# Patient Record
Sex: FEMALE | Race: BLACK OR AFRICAN AMERICAN | NOT HISPANIC OR LATINO | Employment: FULL TIME | URBAN - METROPOLITAN AREA
[De-identification: names, ages, dates, MRNs, and addresses within clinical notes are randomized per-mention and may not be internally consistent; named-entity substitution may affect disease eponyms.]

---

## 2017-01-01 PROCEDURE — 99283 EMERGENCY DEPT VISIT LOW MDM: CPT | Mod: 25

## 2017-01-02 ENCOUNTER — HOSPITAL ENCOUNTER (EMERGENCY)
Facility: HOSPITAL | Age: 27
Discharge: HOME | End: 2017-01-02
Attending: EMERGENCY MEDICINE
Payer: COMMERCIAL

## 2017-01-02 VITALS
WEIGHT: 293 LBS | BODY MASS INDEX: 45.99 KG/M2 | TEMPERATURE: 98 F | DIASTOLIC BLOOD PRESSURE: 76 MMHG | SYSTOLIC BLOOD PRESSURE: 128 MMHG | HEIGHT: 67 IN | OXYGEN SATURATION: 99 % | HEART RATE: 84 BPM | RESPIRATION RATE: 16 BRPM

## 2017-01-02 DIAGNOSIS — R73.9 HYPERGLYCEMIA: Primary | ICD-10-CM

## 2017-01-02 LAB
B-HCG UR QL: NEGATIVE
BILIRUB UR QL STRIP: NEGATIVE
CLARITY UR: CLEAR
COLOR UR: YELLOW
GLUCOSE UR QL STRIP: NEGATIVE
HGB UR QL STRIP: NEGATIVE
KETONES UR QL STRIP: NEGATIVE
LEUKOCYTE ESTERASE UR QL STRIP: NEGATIVE
NITRITE UR QL STRIP: NEGATIVE
PH UR STRIP: 7 [PH] (ref 5–8)
POCT GLUCOSE: 231 MG/DL (ref 70–110)
PROT UR QL STRIP: NEGATIVE
SP GR UR STRIP: 1.02 (ref 1–1.03)
URN SPEC COLLECT METH UR: NORMAL
UROBILINOGEN UR STRIP-ACNC: NEGATIVE EU/DL

## 2017-01-02 PROCEDURE — 82962 GLUCOSE BLOOD TEST: CPT

## 2017-01-02 PROCEDURE — 81003 URINALYSIS AUTO W/O SCOPE: CPT

## 2017-01-02 PROCEDURE — 81025 URINE PREGNANCY TEST: CPT

## 2017-01-02 NOTE — DISCHARGE INSTRUCTIONS
How to Check Your Blood Sugar  Keeping track of how much sugar (glucose) is in your blood is an important part of self-care when you have diabetes. This is also called self-monitoring of blood glucose (SMBG). To make sure your glucose and insulin are in balance, check your blood sugar as instructed by your healthcare provider. You may need to check your blood glucose levels at certain times every day. Or you may need to check them only a few times a week.  What you need  To check your blood sugar, make sure you have the following:   · A small pricking needle (lancing device)  · Test strips  · A glucose meter  · A notebook, chart, or log book and pen or pencil   Using a blood glucose meter    You can check your blood sugar at home, at work, and anywhere else. Your diabetes team will help you choose a blood glucose meter. A meter measures the amount of glucose in a tiny drop of blood. Youll use a device called a lancet to draw a drop of blood. Put the strip in the meter first. Then touch the test strip to the drop of blood. The meter then gives you a number (reading) that tells you the level of your blood sugar.  Aim for your target range  Your blood sugar should be in your target range--not too high and not too low. A target range is where your blood sugar level is healthiest. Staying in this range as much as possible will help lower your risk for health problems (complications). Your diabetes team will help you figure out the best target range for you. That range depends on many things. They include your age, other health problems, how well your diabetes is controlled, and how long you have had diabetes. In general, target ranges are:  · Control of blood glucose (hemoglobin A1c or A1c): generally, 7.0% or less. You will usually have this test at the lab.  · Before a meal (preprandial glucose): Between 80 and 130 mg/dL.  · One to 2 hours after a meal (postprandial glucose): Less than 180 mg/dL.  Track your  readings  Use a notebook, chart, or log book to keep track of your readings. Write down the date, time, and your blood sugar level numbers. This helps you see patterns, such as high blood sugar after eating certain foods. Take your log along when you see your healthcare provider. Your blood glucose levels will help your provider decide if he or she needs to make any changes to your management plan. To check your blood sugar, follow the steps below.  Step 1. Get ready  · Wash your hands with soap and warm (not hot) water.  · Follow all of the instructions that came with your glucometer. Be sure that your test strips were designed to be used with your meter and are not .   Step 2. Draw a drop of blood  · Prick the side of your finger with the lancet. Squeeze gently until you get a drop of blood. Squeezing too hard can cause an inaccurate reading.  · Put the lancet in a special sharps container. Ask your healthcare team where you can buy one or what you can use to throw away any sharps.  · If you are unable to get enough blood, hold your hand at your side and gently shake it.  Step 3. Place the drop on a strip  · Wait for the meter to show a message or symbol that it is time to test.  · Touch the test strip to the drop of blood.  · Be sure to follow the instructions included with meter.  Step 4. Read and record your results  · Wait for your meter to show the result.  · If you see an error message, recheck using a fresh strip and a fresh drop of blood. Also recheck if the glucose numbers aren't what you expect--too low without symptoms, or too high for no reason.  · Write the results in your log book.  © 2697-1073 [employer]. [address] [place] [address], [place] [address]. All rights reserved. This information is not intended as a substitute for professional medical care. Always follow your healthcare professional's instructions.          Diabetes with High Blood Sugar  You have been treated for high  "blood sugar (hyperglycemia). This may be because of an infection or other illness, eating too many sweets or starches, or not taking enough insulin.  Home care  Monitor and write down your blood sugar level at least twice a day. Do this before breakfast and before dinner. If you take insulin, record your routine insulin dose as well. Also record any additional doses required based on your sliding scale. Do this for the next 3 to 5 days.  High blood sugar may cause symptoms that you can learn to recognize, such as:  · Frequent urination  · Thirst  · Dizziness  · Headache  · Shortness of breath  · Breath that smells fruity  · Nausea or vomiting  · Abdominal pain  · Drowsiness or loss of consciousness  If you have high-blood-sugar symptoms, use a blood or urine test to find out what your blood sugar level is. If it is above your usual range, use the "sliding scale" regular insulin dose prescribed by your healthcare provider. If you were not given a range for your insulin dose, contact your healthcare provider for more advice.  Follow-up care  Follow up with your healthcare provider, or as advised. You may need to meet with your healthcare provider in the next week. You will likely review your blood sugar records together. You may also talk to your provider about adjusting your dose of insulin or other medicine for blood sugar.  When to seek medical advice  Call your healthcare provider right away if these occur:  · High blood sugar symptoms (Symptoms are described above.)  · Blood sugar over 300 mg/dl If you cant reach your healthcare provider, go to a hospital emergency room or urgent care center  © 2568-1396 [employer]. [address] [place] [address], [place] [address]. All rights reserved. This information is not intended as a substitute for professional medical care. Always follow your healthcare professional's instructions.          High Blood Sugar (Hyperglycemia)  Too much glucose (sugar) in your blood " is called hyperglycemia or high blood sugar. High blood sugar can lead to a dangerous condition called ketoacidosis. In severe cases, it can lead to dehydration and coma.    Possible causes of hyperglycemia  · Inadequate treatment plan for diabetes   · Being sick  · Being under stress  · Taking certain medicines, such as steroids  · Eating too much food, especially carbohydrates  · Being less active than usual  · Not taking enough diabetes medicine  Symptoms of hyperglycemia  Hyperglycemia may not cause symptoms. If you do have symptoms, they may include:  · Thirst  · Frequent need to urinate  · Feeling tired  · Nausea and vomiting  · Itchy, dry skin  · Blurry vision  · Fast breathing and breath that smells fruity   · Weakness  · Dizziness  · Wounds or skin infections that dont heal  · Unexplained weight loss if hyperglycemia lasts for more than a few days   What you should do  Make sure you do the following:  · Check your blood sugar.  · Drink plenty of sugar-free, caffeine-free liquids such as water. Dont drink fruit juice.  · Check your blood sugar again every 4 hours. If you take insulin or diabetes medicines, follow your sick-day plan for taking medicine. Call your healthcare provider if you are not able to eat.  · Check your blood or urine for ketones as directed.  · Call your healthcare provider if your blood sugar and ketones do not return to your target range.  Preventing high blood sugar  To help keep your blood sugar from getting too high:  · Control stress.  · When you're ill, follow your sick-day plan.   · Follow your meal plan. Eat only the amount of food on your meal plan  · Follow your exercise plan.  · Take your insulin or diabetes medicines as directed by your healthcare team. Also test your blood sugar as directed. If the plan is not working for you, discuss it with your healthcare provider.  Other things to do  · Carry a medical ID card, a compact USB drive, or wear a medical alert bracelet or  necklace. It should say that you have diabetes. It should also say what to do in case you pass out or go into a coma.  · Make sure family, friends, and coworkers know the signs of high blood sugar. Tell them what to do if your blood sugar gets very high and you cant help yourself.  · Talk to your healthcare team about other things you can do to prevent high blood sugar.   Special note: Drink plenty of sugar-free and caffeine-free liquids when you feel symptoms of hyperglycemia. Call your healthcare provider if you keep having episodes of hyperglycemia.   © 1715-8650 [employer]. [address] [place] [address], [place] [address]. All rights reserved. This information is not intended as a substitute for professional medical care. Always follow your healthcare professional's instructions.

## 2017-01-02 NOTE — ED PROVIDER NOTES
SCRIBE #1 NOTE: I, Katie Sosa, am scribing for, and in the presence of, Lester Lee MD. I have scribed the entire note.      History      Chief Complaint   Patient presents with    Hyperglycemia     reports CBG >300 earlier today. non compliant with metformin.  in triage       Review of patient's allergies indicates:  No Known Allergies     HPI   HPI    1/2/2017, 12:21 AM   History obtained from the patient      History of Present Illness: Patricia Sutton is a 26 y.o. female patient who presents to the Emergency Department for hyperglycemia which onset earlier today. Patient reports noncompliance with Metformin, states she last took it today and yesterday. She states she does not take it because it makes her stomach hurt. She reports a CBG in the 400s yesterday and 300s earlier today. CBG was 231 in triage. Sx have been constant and moderate in severity. No mitigating or exacerbating factors reported. Associated sx include polydipsia and polyuria. Pt denies any fever, cough, nausea, vomiting, CP, SOB, HA, dizziness, extremity weakness/numbness, and all other sx. No further complaints or concerns at this time.        Arrival mode: Personal vehicle     PCP: Gill Singleton MD       Past Medical History:  Past Medical History   Diagnosis Date    Diabetes mellitus     Fe deficiency anemia     Hypertension        Past Surgical History:  Past Surgical History   Procedure Laterality Date    Tonsillectomy           Family History:  Family History   Problem Relation Age of Onset    Diabetes Mother     Hypertension Mother     Heart disease Father     Diabetes Father     Cancer Maternal Grandmother      breast    Heart disease Maternal Grandfather     Diabetes Maternal Grandfather     Retinal detachment Sister     Diabetes Sister     Lupus Neg Hx     Psoriasis Neg Hx     Eczema Neg Hx        Social History:  Social History     Social History Main Topics    Smoking status: Never  Smoker    Smokeless tobacco: Not on file    Alcohol use Yes      Comment: occasionally    Drug use: No    Sexual activity: No       ROS   Review of Systems   Constitutional: Negative for fever.   HENT: Negative for sore throat.    Respiratory: Negative for shortness of breath.    Cardiovascular: Negative for chest pain.   Gastrointestinal: Negative for nausea and vomiting.   Endocrine: Positive for polydipsia and polyuria.   Genitourinary: Negative for dysuria.   Musculoskeletal: Negative for back pain.   Skin: Negative for rash.   Neurological: Negative for dizziness, weakness, numbness and headaches.   Hematological: Does not bruise/bleed easily.       Physical Exam    Initial Vitals   BP Pulse Resp Temp SpO2   01/01/17 2358 01/01/17 2358 01/01/17 2358 01/01/17 2358 01/01/17 2358   151/95 86 18 98 °F (36.7 °C) 98 %      Physical Exam  Nursing Notes and Vital Signs Reviewed.  Constitutional: Patient is in no acute distress. Awake and alert. Well-developed and well-nourished.  Head: Atraumatic. Normocephalic.  Eyes: PERRL. EOM intact. Conjunctivae are not pale. No scleral icterus.  ENT: Mucous membranes are moist. Oropharynx is clear and symmetric.    Neck: Supple. Full ROM. No lymphadenopathy.  Cardiovascular: Regular rate. Regular rhythm. No murmurs, rubs, or gallops. Distal pulses are 2+ and symmetric.  Pulmonary/Chest: No respiratory distress. Clear to auscultation bilaterally. No wheezing, rales, or rhonchi.  Abdominal: Soft and non-distended.  There is no tenderness.  No rebound, guarding, or rigidity.  Good bowel sounds.    Genitourinary: No CVA tenderness  Musculoskeletal: Moves all extremities. No obvious deformities. No edema. No calf tenderness.  Skin: Warm and dry.  Neurological:  Alert, awake, and appropriate.  Normal speech.  No acute focal neurological deficits are appreciated.  Psychiatric: Normal affect. Good eye contact. Appropriate in content.    ED Course    Procedures  ED Vital  "Signs:  Vitals:    01/01/17 2358 01/02/17 0030 01/02/17 0108   BP: (!) 151/95 130/76 128/76   Pulse: 86 89 84   Resp: 18 18 16   Temp: 98 °F (36.7 °C)  98 °F (36.7 °C)   TempSrc: Oral     SpO2: 98% 99% 99%   Weight: (!) 147 kg (324 lb)     Height: 5' 7" (1.702 m)         Abnormal Lab Results:  Labs Reviewed   POCT GLUCOSE - Abnormal; Notable for the following:        Result Value    POCT Glucose 231 (*)     All other components within normal limits   URINALYSIS   PREGNANCY TEST, URINE RAPID        All Lab Results:  Results for orders placed or performed during the hospital encounter of 01/02/17   Urinalysis   Result Value Ref Range    Specimen UA Urine, Clean Catch     Color, UA Yellow Yellow, Straw, Marjorie    Appearance, UA Clear Clear    pH, UA 7.0 5.0 - 8.0    Specific Gravity, UA 1.025 1.005 - 1.030    Protein, UA Negative Negative    Glucose, UA Negative Negative    Ketones, UA Negative Negative    Bilirubin (UA) Negative Negative    Occult Blood UA Negative Negative    Nitrite, UA Negative Negative    Urobilinogen, UA Negative <2.0 EU/dL    Leukocytes, UA Negative Negative   Pregnancy, urine rapid (UPT)   Result Value Ref Range    Preg Test, Ur Negative    POCT glucose   Result Value Ref Range    POCT Glucose 231 (H) 70 - 110 mg/dL         Imaging Results:  Imaging Results     None                  The Emergency Provider reviewed the vital signs and test results, which are outlined above.    ED Discussion     1:19 AM: Discussed with pt all pertinent ED information and results. Discussed pt dx and plan of tx. Gave pt all f/u and return to the ED instructions. All questions and concerns were addressed at this time. Pt expresses understanding of information and instructions, and is comfortable with plan to discharge. Pt is stable for discharge.    I discussed with patient and/or family/caretaker that evaluation in the ED does not suggest any emergent or life threatening medical conditions requiring immediate " intervention beyond what was provided in the ED, and I believe patient is safe for discharge.  Regardless, an unremarkable evaluation in the ED does not preclude the development or presence of a serious of life threatening condition. As such, patient was instructed to return immediately for any worsening or change in current symptoms.      ED Medication(s):  Medications - No data to display    Discharge Medication List as of 1/2/2017  1:06 AM          Follow-up Information     Follow up with Gill Singleton MD. Call in 1 day.    Specialty:  Family Medicine    Why:  for evaluation of Diabetic regimen    Contact information:    [address] [id number] [phone]            Pre-hypertension/Hypertension: The pt has been informed that they may have pre-hypertension or hypertension based on a blood pressure reading in the ED. I recommend that the pt call the PCP listed on their discharge instructions or a physician of their choice this week to arrange f/u for further evaluation of possible pre-hypertension or hypertension.     Medical Decision Making    Medical Decision Making:   Clinical Tests:   Lab Tests: Ordered and Reviewed           Scribe Attestation:   Scribe #1: I performed the above scribed service and the documentation accurately describes the services I performed. I attest to the accuracy of the note.    Attending:   Physician Attestation Statement for Scribe #1: I, Lester Lee MD, personally performed the services described in this documentation, as scribed by Katie Sosa, in my presence, and it is both accurate and complete.          Clinical Impression       ICD-10-CM ICD-9-CM   1. Hyperglycemia R73.9 790.29       Disposition:   Disposition: Discharged  Condition: Stable         Lester Lee MD  01/02/17 0136

## 2017-01-02 NOTE — ED NOTES
Pt ambulatory to RR. Steady gait noted. Pt provided with CCMS cup. Instructed on need for sample. Pt informed to alert staff when urine sample available. Pt verbalizes understanding.

## 2017-01-02 NOTE — ED AVS SNAPSHOT
[facility]  [address]  [place] [address]               Patricia Sutton   2017 12:02 AM   ED    Description:  Female : 1990   Department:  [facility]           Your Care was Coordinated By:     Provider Role From To    Lester Lee MD Attending Provider 17 0004 --      Reason for Visit     Hyperglycemia           Diagnoses this Visit        Comments    Hyperglycemia    -  Primary       ED Disposition     None           To Do List           Follow-up Information     Follow up with Gill Singleton MD. Call in 1 day.    Specialty:  Family Medicine    Why:  for evaluation of Diabetic regimen    Contact information:    [address]  [place] [address] [phone]        [facility]     [facility] -  Assistance  Registered nurses in the [facility] provide clinical advisement, health education, appointment booking, and other advisory services.  Call for this free service at [phone].             Medications           Message regarding Medications     Verify the changes and/or additions to your medication regime listed below are the same as discussed with your clinician today.  If any of these changes or additions are incorrect, please notify your healthcare provider.        STOP taking these medications     hydrocodone-acetaminophen 5-325mg (NORCO) 5-325 mg per tablet            Verify that the below list of medications is an accurate representation of the medications you are currently taking.  If none reported, the list may be blank. If incorrect, please contact your healthcare provider. Carry this list with you in case of emergency.           Current Medications     blood sugar diagnostic Strp Check blood glucose once daily    ergocalciferol (ERGOCALCIFEROL) 50,000 unit Cap Take 1 capsule (50,000 Units total) by mouth every 7 days.    ferrous sulfate 325 mg (65 mg  "iron) Tab tablet Take 1 tablet (325 mg total) by mouth 2 (two) times daily.    fluticasone (FLONASE) 50 mcg/actuation nasal spray 1 spray by Each Nare route daily as needed for Rhinitis.    levocetirizine (XYZAL) 5 MG tablet Take 1 tablet each morning.    lisinopril-hydrochlorothiazide (PRINZIDE,ZESTORETIC) 20-25 mg Tab Take 1 tablet by mouth once daily.    metformin (GLUCOPHAGE) 1000 MG tablet Take 1 tablet (1,000 mg total) by mouth 2 (two) times daily with meals.    omeprazole (PRILOSEC) 20 MG capsule Take 1 capsule (20 mg total) by mouth once daily.    permethrin (ELIMITE) 5 % cream Apply neck down at night x 1 application. Wash off in a.m. Repeat in 2 weeks    triamcinolone acetonide 0.1% (KENALOG) 0.1 % cream AAA bid           Clinical Reference Information           Your Vitals Were     BP Pulse Temp Resp Height Weight    130/76 89 98 °F (36.7 °C) (Oral) 18 5' 7" (1.702 m) 147 kg (324 lb)    SpO2 BMI             99% 50.75 kg/m2         Allergies as of 1/2/2017     No Known Allergies      Immunizations Administered on Date of Encounter - 1/2/2017     None      ED Micro, Lab, POCT     Start Ordered       Status Ordering Provider    01/02/17 0013 01/02/17 0013  Pregnancy, urine rapid (UPT)  Once      Final result     01/02/17 0012 01/02/17 0011  Urinalysis  STAT      Final result     01/02/17 0000 01/02/17 0000  POCT glucose  Once      Final result       ED Imaging Orders     None        Discharge Instructions         How to Check Your Blood Sugar  Keeping track of how much sugar (glucose) is in your blood is an important part of self-care when you have diabetes. This is also called self-monitoring of blood glucose (SMBG). To make sure your glucose and insulin are in balance, check your blood sugar as instructed by your healthcare provider. You may need to check your blood glucose levels at certain times every day. Or you may need to check them only a few times a week.  What you need  To check your blood sugar, " make sure you have the following:   · A small pricking needle (lancing device)  · Test strips  · A glucose meter  · A notebook, chart, or log book and pen or pencil   Using a blood glucose meter    You can check your blood sugar at home, at work, and anywhere else. Your diabetes team will help you choose a blood glucose meter. A meter measures the amount of glucose in a tiny drop of blood. Youll use a device called a lancet to draw a drop of blood. Put the strip in the meter first. Then touch the test strip to the drop of blood. The meter then gives you a number (reading) that tells you the level of your blood sugar.  Aim for your target range  Your blood sugar should be in your target range--not too high and not too low. A target range is where your blood sugar level is healthiest. Staying in this range as much as possible will help lower your risk for health problems (complications). Your diabetes team will help you figure out the best target range for you. That range depends on many things. They include your age, other health problems, how well your diabetes is controlled, and how long you have had diabetes. In general, target ranges are:  · Control of blood glucose (hemoglobin A1c or A1c): generally, 7.0% or less. You will usually have this test at the lab.  · Before a meal (preprandial glucose): Between 80 and 130 mg/dL.  · One to 2 hours after a meal (postprandial glucose): Less than 180 mg/dL.  Track your readings  Use a notebook, chart, or log book to keep track of your readings. Write down the date, time, and your blood sugar level numbers. This helps you see patterns, such as high blood sugar after eating certain foods. Take your log along when you see your healthcare provider. Your blood glucose levels will help your provider decide if he or she needs to make any changes to your management plan. To check your blood sugar, follow the steps below.  Step 1. Get ready  · Wash your hands with soap and warm  (not hot) water.  · Follow all of the instructions that came with your glucometer. Be sure that your test strips were designed to be used with your meter and are not .   Step 2. Draw a drop of blood  · Prick the side of your finger with the lancet. Squeeze gently until you get a drop of blood. Squeezing too hard can cause an inaccurate reading.  · Put the lancet in a special sharps container. Ask your healthcare team where you can buy one or what you can use to throw away any sharps.  · If you are unable to get enough blood, hold your hand at your side and gently shake it.  Step 3. Place the drop on a strip  · Wait for the meter to show a message or symbol that it is time to test.  · Touch the test strip to the drop of blood.  · Be sure to follow the instructions included with meter.  Step 4. Read and record your results  · Wait for your meter to show the result.  · If you see an error message, recheck using a fresh strip and a fresh drop of blood. Also recheck if the glucose numbers aren't what you expect--too low without symptoms, or too high for no reason.  · Write the results in your log book.  © 9212-5031 [employer]. [address] [place] [address], [place] [address]. All rights reserved. This information is not intended as a substitute for professional medical care. Always follow your healthcare professional's instructions.          Diabetes with High Blood Sugar  You have been treated for high blood sugar (hyperglycemia). This may be because of an infection or other illness, eating too many sweets or starches, or not taking enough insulin.  Home care  Monitor and write down your blood sugar level at least twice a day. Do this before breakfast and before dinner. If you take insulin, record your routine insulin dose as well. Also record any additional doses required based on your sliding scale. Do this for the next 3 to 5 days.  High blood sugar may cause symptoms that you can learn to recognize,  "such as:  · Frequent urination  · Thirst  · Dizziness  · Headache  · Shortness of breath  · Breath that smells fruity  · Nausea or vomiting  · Abdominal pain  · Drowsiness or loss of consciousness  If you have high-blood-sugar symptoms, use a blood or urine test to find out what your blood sugar level is. If it is above your usual range, use the "sliding scale" regular insulin dose prescribed by your healthcare provider. If you were not given a range for your insulin dose, contact your healthcare provider for more advice.  Follow-up care  Follow up with your healthcare provider, or as advised. You may need to meet with your healthcare provider in the next week. You will likely review your blood sugar records together. You may also talk to your provider about adjusting your dose of insulin or other medicine for blood sugar.  When to seek medical advice  Call your healthcare provider right away if these occur:  · High blood sugar symptoms (Symptoms are described above.)  · Blood sugar over 300 mg/dl If you cant reach your healthcare provider, go to a hospital emergency room or urgent care center  © 3628-4488 [employer]. [address] [place] [address]. All rights reserved. This information is not intended as a substitute for professional medical care. Always follow your healthcare professional's instructions.          High Blood Sugar (Hyperglycemia)  Too much glucose (sugar) in your blood is called hyperglycemia or high blood sugar. High blood sugar can lead to a dangerous condition called ketoacidosis. In severe cases, it can lead to dehydration and coma.    Possible causes of hyperglycemia  · Inadequate treatment plan for diabetes   · Being sick  · Being under stress  · Taking certain medicines, such as steroids  · Eating too much food, especially carbohydrates  · Being less active than usual  · Not taking enough diabetes medicine  Symptoms of hyperglycemia  Hyperglycemia may not cause " symptoms. If you do have symptoms, they may include:  · Thirst  · Frequent need to urinate  · Feeling tired  · Nausea and vomiting  · Itchy, dry skin  · Blurry vision  · Fast breathing and breath that smells fruity   · Weakness  · Dizziness  · Wounds or skin infections that dont heal  · Unexplained weight loss if hyperglycemia lasts for more than a few days   What you should do  Make sure you do the following:  · Check your blood sugar.  · Drink plenty of sugar-free, caffeine-free liquids such as water. Dont drink fruit juice.  · Check your blood sugar again every 4 hours. If you take insulin or diabetes medicines, follow your sick-day plan for taking medicine. Call your healthcare provider if you are not able to eat.  · Check your blood or urine for ketones as directed.  · Call your healthcare provider if your blood sugar and ketones do not return to your target range.  Preventing high blood sugar  To help keep your blood sugar from getting too high:  · Control stress.  · When you're ill, follow your sick-day plan.   · Follow your meal plan. Eat only the amount of food on your meal plan  · Follow your exercise plan.  · Take your insulin or diabetes medicines as directed by your healthcare team. Also test your blood sugar as directed. If the plan is not working for you, discuss it with your healthcare provider.  Other things to do  · Carry a medical ID card, a compact USB drive, or wear a medical alert bracelet or necklace. It should say that you have diabetes. It should also say what to do in case you pass out or go into a coma.  · Make sure family, friends, and coworkers know the signs of high blood sugar. Tell them what to do if your blood sugar gets very high and you cant help yourself.  · Talk to your healthcare team about other things you can do to prevent high blood sugar.   Special note: Drink plenty of sugar-free and caffeine-free liquids when you feel symptoms of hyperglycemia. Call your healthcare  provider if you keep having episodes of hyperglycemia.   © 6118-0827 [employer]. [address] [place] [address], [place] [address]. All rights reserved. This information is not intended as a substitute for professional medical care. Always follow your healthcare professional's instructions.           [facility] complies with applicable Federal civil rights laws and does not discriminate on the basis of race, color, national origin, age, disability, or sex.        Language Assistance Services     ATTENTION: Language assistance services are available, free of charge. Please call [phone].      ATENCIÓN: Si habla español, tiene a bradshaw disposición servicios gratuitos de asistencia lingüística. Llame al [phone].     CHÚ Ý: N?u b?n nói Ti?ng Vi?t, có các d?ch v? h? tr? ngôn ng? mi?n phí dành cho b?n. G?i s? [phone].

## 2017-01-05 ENCOUNTER — OFFICE VISIT (OUTPATIENT)
Dept: INTERNAL MEDICINE | Facility: CLINIC | Age: 27
End: 2017-01-05
Payer: COMMERCIAL

## 2017-01-05 ENCOUNTER — LAB VISIT (OUTPATIENT)
Dept: LAB | Facility: HOSPITAL | Age: 27
End: 2017-01-05
Attending: FAMILY MEDICINE
Payer: COMMERCIAL

## 2017-01-05 VITALS
WEIGHT: 293 LBS | TEMPERATURE: 98 F | HEIGHT: 67 IN | BODY MASS INDEX: 45.99 KG/M2 | DIASTOLIC BLOOD PRESSURE: 80 MMHG | HEART RATE: 89 BPM | SYSTOLIC BLOOD PRESSURE: 128 MMHG | OXYGEN SATURATION: 99 %

## 2017-01-05 DIAGNOSIS — Z01.419 WELL WOMAN EXAM: ICD-10-CM

## 2017-01-05 DIAGNOSIS — D50.9 IRON DEFICIENCY ANEMIA, UNSPECIFIED IRON DEFICIENCY ANEMIA TYPE: ICD-10-CM

## 2017-01-05 DIAGNOSIS — I15.2 HYPERTENSION ASSOCIATED WITH DIABETES: ICD-10-CM

## 2017-01-05 DIAGNOSIS — E11.9 TYPE 2 DIABETES MELLITUS WITHOUT COMPLICATION, WITHOUT LONG-TERM CURRENT USE OF INSULIN: ICD-10-CM

## 2017-01-05 DIAGNOSIS — Z00.00 ROUTINE GENERAL MEDICAL EXAMINATION AT A HEALTH CARE FACILITY: Primary | ICD-10-CM

## 2017-01-05 DIAGNOSIS — K21.9 GASTROESOPHAGEAL REFLUX DISEASE, ESOPHAGITIS PRESENCE NOT SPECIFIED: ICD-10-CM

## 2017-01-05 DIAGNOSIS — J30.2 SEASONAL ALLERGIC RHINITIS, UNSPECIFIED ALLERGIC RHINITIS TRIGGER: ICD-10-CM

## 2017-01-05 DIAGNOSIS — E66.01 MORBID OBESITY WITH BMI OF 45.0-49.9, ADULT: ICD-10-CM

## 2017-01-05 DIAGNOSIS — I10 ESSENTIAL HYPERTENSION: ICD-10-CM

## 2017-01-05 DIAGNOSIS — Z00.00 ROUTINE GENERAL MEDICAL EXAMINATION AT A HEALTH CARE FACILITY: ICD-10-CM

## 2017-01-05 DIAGNOSIS — E11.59 HYPERTENSION ASSOCIATED WITH DIABETES: ICD-10-CM

## 2017-01-05 LAB
ALBUMIN SERPL BCP-MCNC: 3.8 G/DL
ALP SERPL-CCNC: 90 U/L
ALT SERPL W/O P-5'-P-CCNC: 18 U/L
ANION GAP SERPL CALC-SCNC: 16 MMOL/L
AST SERPL-CCNC: 20 U/L
BASOPHILS # BLD AUTO: 0.02 K/UL
BASOPHILS NFR BLD: 0.3 %
BILIRUB SERPL-MCNC: 0.3 MG/DL
BUN SERPL-MCNC: 9 MG/DL
CALCIUM SERPL-MCNC: 9.2 MG/DL
CHLORIDE SERPL-SCNC: 100 MMOL/L
CHOLEST/HDLC SERPL: 3.9 {RATIO}
CO2 SERPL-SCNC: 18 MMOL/L
CREAT SERPL-MCNC: 1 MG/DL
DIFFERENTIAL METHOD: ABNORMAL
EOSINOPHIL # BLD AUTO: 0.1 K/UL
EOSINOPHIL NFR BLD: 1 %
ERYTHROCYTE [DISTWIDTH] IN BLOOD BY AUTOMATED COUNT: 15.7 %
EST. GFR  (AFRICAN AMERICAN): >60 ML/MIN/1.73 M^2
EST. GFR  (NON AFRICAN AMERICAN): >60 ML/MIN/1.73 M^2
FERRITIN SERPL-MCNC: 26 NG/ML
GLUCOSE SERPL-MCNC: 228 MG/DL
HCT VFR BLD AUTO: 37.6 %
HDL/CHOLESTEROL RATIO: 25.5 %
HDLC SERPL-MCNC: 161 MG/DL
HDLC SERPL-MCNC: 41 MG/DL
HGB BLD-MCNC: 11.8 G/DL
LDLC SERPL CALC-MCNC: 96.6 MG/DL
LYMPHOCYTES # BLD AUTO: 2.5 K/UL
LYMPHOCYTES NFR BLD: 32.9 %
MCH RBC QN AUTO: 21.3 PG
MCHC RBC AUTO-ENTMCNC: 31.4 %
MCV RBC AUTO: 68 FL
MONOCYTES # BLD AUTO: 0.5 K/UL
MONOCYTES NFR BLD: 5.8 %
NEUTROPHILS # BLD AUTO: 4.6 K/UL
NEUTROPHILS NFR BLD: 59.7 %
NONHDLC SERPL-MCNC: 120 MG/DL
PLATELET # BLD AUTO: 381 K/UL
PMV BLD AUTO: 9.5 FL
POTASSIUM SERPL-SCNC: 3.6 MMOL/L
PROT SERPL-MCNC: 8.6 G/DL
RBC # BLD AUTO: 5.53 M/UL
SODIUM SERPL-SCNC: 134 MMOL/L
TRIGL SERPL-MCNC: 117 MG/DL
TSH SERPL DL<=0.005 MIU/L-ACNC: 1.36 UIU/ML
WBC # BLD AUTO: 7.72 K/UL

## 2017-01-05 PROCEDURE — 90471 IMMUNIZATION ADMIN: CPT | Mod: S$GLB,,, | Performed by: FAMILY MEDICINE

## 2017-01-05 PROCEDURE — 3074F SYST BP LT 130 MM HG: CPT | Mod: S$GLB,,, | Performed by: FAMILY MEDICINE

## 2017-01-05 PROCEDURE — 83036 HEMOGLOBIN GLYCOSYLATED A1C: CPT

## 2017-01-05 PROCEDURE — 80061 LIPID PANEL: CPT

## 2017-01-05 PROCEDURE — 99999 PR PBB SHADOW E&M-EST. PATIENT-LVL III: CPT | Mod: PBBFAC,,, | Performed by: FAMILY MEDICINE

## 2017-01-05 PROCEDURE — 36415 COLL VENOUS BLD VENIPUNCTURE: CPT | Mod: PO

## 2017-01-05 PROCEDURE — 90688 IIV4 VACCINE SPLT 0.5 ML IM: CPT | Mod: S$GLB,,, | Performed by: FAMILY MEDICINE

## 2017-01-05 PROCEDURE — 85025 COMPLETE CBC W/AUTO DIFF WBC: CPT

## 2017-01-05 PROCEDURE — 80053 COMPREHEN METABOLIC PANEL: CPT

## 2017-01-05 PROCEDURE — 3079F DIAST BP 80-89 MM HG: CPT | Mod: S$GLB,,, | Performed by: FAMILY MEDICINE

## 2017-01-05 PROCEDURE — 99395 PREV VISIT EST AGE 18-39: CPT | Mod: 25,S$GLB,, | Performed by: FAMILY MEDICINE

## 2017-01-05 PROCEDURE — 84443 ASSAY THYROID STIM HORMONE: CPT

## 2017-01-05 PROCEDURE — 82728 ASSAY OF FERRITIN: CPT

## 2017-01-05 RX ORDER — CYCLOBENZAPRINE HCL 10 MG
10 TABLET ORAL
COMMUNITY
Start: 2016-11-06 | End: 2022-12-07

## 2017-01-05 RX ORDER — METFORMIN HYDROCHLORIDE 1000 MG/1
1000 TABLET ORAL 2 TIMES DAILY WITH MEALS
Qty: 180 TABLET | Refills: 3 | Status: SHIPPED | OUTPATIENT
Start: 2017-01-05 | End: 2018-02-15 | Stop reason: SDUPTHER

## 2017-01-05 RX ORDER — OMEPRAZOLE 20 MG/1
20 CAPSULE, DELAYED RELEASE ORAL DAILY
Qty: 30 CAPSULE | Refills: 4 | Status: SHIPPED | OUTPATIENT
Start: 2017-01-05 | End: 2022-12-07

## 2017-01-05 RX ORDER — FLUTICASONE PROPIONATE 50 MCG
1 SPRAY, SUSPENSION (ML) NASAL DAILY PRN
Qty: 16 G | Refills: 1 | Status: SHIPPED | OUTPATIENT
Start: 2017-01-05 | End: 2022-12-07

## 2017-01-05 RX ORDER — LISINOPRIL AND HYDROCHLOROTHIAZIDE 20; 25 MG/1; MG/1
1 TABLET ORAL DAILY
Qty: 90 TABLET | Refills: 3 | Status: SHIPPED | OUTPATIENT
Start: 2017-01-05 | End: 2018-02-15 | Stop reason: SDUPTHER

## 2017-01-05 RX ORDER — LISINOPRIL AND HYDROCHLOROTHIAZIDE 20; 25 MG/1; MG/1
1 TABLET ORAL DAILY
Qty: 90 TABLET | Refills: 3 | Status: CANCELLED | OUTPATIENT
Start: 2017-01-05

## 2017-01-05 NOTE — PROGRESS NOTES
"Subjective:       Patient ID: Patricia Sutton is a 26 y.o. female.    Chief Complaint: Annual Exam    HPI Comments: 26-year-old Afro-American female patient coming by her mother with Patient Active Problem List:     Anemia     Hypertension associated with diabetes  Here for routine annual physicals.  Patient reported that she's been having blood glucose levels running in the range of 200s to 300s, for which she went to ER recently and was advised to continue taking her medication, patient currently on metformin thousand gram twice daily.  Has not been watching her diet and will start exercise.  Patient reported that she was having polyuria polydipsia, occasional dizziness but denies of any chest pain or shortness of breath or palpitations, vision disturbances, tingling or numbness sensation to extremities.   Patient requesting refills on her medications today     Review of Systems   Constitutional: Positive for fatigue.   Eyes: Negative for visual disturbance.   Respiratory: Negative for shortness of breath.    Cardiovascular: Negative for chest pain and leg swelling.   Gastrointestinal: Negative for abdominal pain, nausea and vomiting.   Endocrine: Positive for polydipsia and polyuria. Negative for polyphagia.   Musculoskeletal: Negative for myalgias.   Skin: Negative for rash.   Neurological: Positive for numbness. Negative for weakness, light-headedness and headaches.   Psychiatric/Behavioral: Negative for sleep disturbance.         Visit Vitals    /80    Pulse 89    Temp 97.9 °F (36.6 °C) (Tympanic)    Ht 5' 7" (1.702 m)    Wt (!) 141.8 kg (312 lb 9.8 oz)    SpO2 99%    BMI 48.96 kg/m2     Objective:      Physical Exam   Constitutional: She is oriented to person, place, and time. She appears well-developed and well-nourished.   HENT:   Head: Normocephalic and atraumatic.   Mouth/Throat: Oropharynx is clear and moist.   Cardiovascular: Normal rate, regular rhythm and normal heart sounds.    No " murmur heard.  Pulses:       Dorsalis pedis pulses are 2+ on the right side, and 2+ on the left side.   Pulmonary/Chest: Effort normal and breath sounds normal. She has no wheezes.   Abdominal: Soft. Bowel sounds are normal. There is no tenderness.   Musculoskeletal: She exhibits no edema or tenderness.   Feet:   Right Foot:   Protective Sensation: 10 sites tested. 10 sites sensed.   Skin Integrity: Positive for dry skin. Negative for callus.   Left Foot:   Protective Sensation: 10 sites tested. 10 sites sensed.   Skin Integrity: Positive for dry skin. Negative for callus.   Neurological: She is alert and oriented to person, place, and time.   Skin: Skin is warm and dry. No rash noted.   Psychiatric: She has a normal mood and affect.         Assessment:       1. Routine general medical examination at a health care facility    2. Type 2 diabetes mellitus without complication, without long-term current use of insulin    3. Hypertension associated with diabetes    4. Iron deficiency anemia, unspecified iron deficiency anemia type    5. Seasonal allergic rhinitis, unspecified allergic rhinitis trigger    6. Well woman exam    7. Morbid obesity with BMI of 45.0-49.9, adult    8. Gastroesophageal reflux disease, esophagitis presence not specified        Plan:   Routine general medical examination at a health care facility  -     CBC auto differential; Future; Expected date: 1/5/17  -     Comprehensive metabolic panel; Future; Expected date: 1/5/17  -     Lipid panel; Future; Expected date: 1/5/17  -     TSH; Future; Expected date: 1/5/17  Vital signs stable today.  Clinical exam normal.   Encouraged to start strict lifestyle modifications with 1800 ADA low-fat and low-cholesterol diet and exercise 30 minutes daily.   Patient will be refer to gynecology for well woman exam.   Flu shot given today    Type 2 diabetes mellitus without complication, without long-term current use of insulin  -     Comprehensive metabolic panel;  Future; Expected date: 1/5/17  -     Lipid panel; Future; Expected date: 1/5/17  -     Hemoglobin A1c; Future; Expected date: 1/5/17  -     Microalbumin/creatinine urine ratio; Future; Expected date: 1/5/17  -     metformin (GLUCOPHAGE) 1000 MG tablet; Take 1 tablet (1,000 mg total) by mouth 2 (two) times daily with meals.  Dispense: 180 tablet; Refill: 3  Reviewed recent labs showing elevated blood glucose levels, will check nonfasting labs today, refill given on metformin thousand milligrams twice daily, diabetic foot exam stable today.   May consider adding Amaryl, after reviewing test results for uncontrolled blood glucose levels, and referral to diabetes clinic if needed  Lifestyle modifications recommended with 1800 ADA low-fat and low-cholesterol diet     Hypertension associated with diabetes  -     Comprehensive metabolic panel; Future; Expected date: 1/5/17  -     Lipid panel; Future; Expected date: 1/5/17  -     lisinopril-hydrochlorothiazide (PRINZIDE,ZESTORETIC) 20-25 mg Tab; Take 1 tablet by mouth once daily.  Dispense: 90 tablet; Refill: 3  Blood pressure stable today currently on lisinopril hydrochlorothiazide 20/25 mg    Iron deficiency anemia, unspecified iron deficiency anemia type  -     CBC auto differential; Future; Expected date: 1/5/17  -     Ferritin; Future; Expected date: 1/5/17  Stable currently not taking any iron supplements at this time    Seasonal allergic rhinitis, unspecified allergic rhinitis trigger  -     fluticasone (FLONASE) 50 mcg/actuation nasal spray; 1 spray by Each Nare route daily as needed for Rhinitis.  Dispense: 16 g; Refill: 1    Well woman exam  -     Ambulatory referral to Gynecology    Morbid obesity with BMI of 45.0-49.9, adult-lifestyle modifications recommended to lose weight with BMI 48.96      Gastroesophageal reflux disease, esophagitis presence not specified  -     omeprazole (PRILOSEC) 20 MG capsule; Take 1 capsule (20 mg total) by mouth once daily.   Dispense: 30 capsule; Refill: 4  Stable on Prilosec 20 mg, refill given today    Flu shot given today

## 2017-01-05 NOTE — MR AVS SNAPSHOT
[facility]  [address] [facility] [place] [address]  Phone: [phone]  Fax: [phone]                  Patricia Sutton   2017 11:40 AM   Office Visit    Description:  Female : 1990   Provider:  Gill Singleton MD   Department:  [facility] - Internal Medicine           Reason for Visit     Annual Exam           Diagnoses this Visit        Comments    Routine general medical examination at a health care facility    -  Primary     Type 2 diabetes mellitus without complication, without long-term current use of insulin         Hypertension associated with diabetes         Iron deficiency anemia, unspecified iron deficiency anemia type         Seasonal allergic rhinitis, unspecified allergic rhinitis trigger         Well woman exam         Morbid obesity with BMI of 45.0-49.9, adult         Gastroesophageal reflux disease, esophagitis presence not specified                To Do List           Future Appointments        Provider Department Dept Phone    2017 3:00 PM LAB, SAME DAY [facility] [phone]    2017 3:20 PM SPECIMEN, [facility] [phone]    2017 2:00 PM Tavia Agarwal NP [facility]/ -[phone]    2017 2:20 PM Gill Singleton MD [facility] [phone]      Goals (5 Years of Data)     None      Follow-Up and Disposition     Return in about 4 months (around 2017), or if symptoms worsen or fail to improve.    Follow-up and Disposition History       These Medications        Disp Refills Start End    metformin (GLUCOPHAGE) 1000 MG tablet 180 tablet 3 2017    Take 1 tablet (1,000 mg total) by mouth 2 (two) times daily with meals. - Oral    Pharmacy: [facility] [address] [place] [address] Ph #: [phone]       lisinopril-hydrochlorothiazide (PRINZIDE,ZESTORETIC) 20-25 mg Tab 90 tablet 3 2017     Take 1 tablet by mouth once daily. - Oral     Pharmacy: [facility] [address] [place] [address] Ph #: [phone]       fluticasone (FLONASE) 50 mcg/actuation nasal spray 16 g 1 1/5/2017     1 spray by Each Nare route daily as needed for Rhinitis. - Each Nare    Pharmacy: [facility] [address] [place] [address] Ph #: [phone]       omeprazole (PRILOSEC) 20 MG capsule 30 capsule 4 1/5/2017 1/5/2018    Take 1 capsule (20 mg total) by mouth once daily. - Oral    Pharmacy: [facility] [address] [place] [address] Ph #: [phone]         [facility]     [facility] - 24/7 Assistance  Registered nurses in the [facility] provide clinical advisement, health education, appointment booking, and other advisory services.  Call for this free service at [phone].             Medications           Message regarding Medications     Verify the changes and/or additions to your medication regime listed below are the same as discussed with your clinician today.  If any of these changes or additions are incorrect, please notify your healthcare provider.        STOP taking these medications     ergocalciferol (ERGOCALCIFEROL) 50,000 unit Cap Take 1 capsule (50,000 Units total) by mouth every 7 days.    permethrin (ELIMITE) 5 % cream Apply neck down at night x 1 application. Wash off in a.m. Repeat in 2 weeks    triamcinolone acetonide 0.1% (KENALOG) 0.1 % cream AAA bid           Verify that the below list of medications is an accurate representation of the medications you are currently taking.  If none reported, the list may be blank. If incorrect, please contact your healthcare provider. Carry this list with you in case of emergency.           Current Medications     blood sugar diagnostic Strp Check blood glucose once daily    lisinopril-hydrochlorothiazide (PRINZIDE,ZESTORETIC) 20-25 mg Tab Take 1 tablet by mouth once daily.    metformin (GLUCOPHAGE) 1000 MG tablet Take 1 tablet (1,000 mg  "total) by mouth 2 (two) times daily with meals.    omeprazole (PRILOSEC) 20 MG capsule Take 1 capsule (20 mg total) by mouth once daily.    cyclobenzaprine (FLEXERIL) 10 MG tablet Take 10 mg by mouth.    ferrous sulfate 325 mg (65 mg iron) Tab tablet Take 1 tablet (325 mg total) by mouth 2 (two) times daily.    fluticasone (FLONASE) 50 mcg/actuation nasal spray 1 spray by Each Nare route daily as needed for Rhinitis.    levocetirizine (XYZAL) 5 MG tablet Take 1 tablet each morning.           Clinical Reference Information           Vital Signs - Last Recorded  Most recent update: 1/5/2017 11:37 AM by Marie Berger MA    BP Pulse Temp Ht Wt SpO2    128/80 89 97.9 °F (36.6 °C) (Tympanic) 5' 7" (1.702 m) (!) 141.8 kg (312 lb 9.8 oz) 99%    BMI                48.96 kg/m2          Blood Pressure          Most Recent Value    BP  128/80      Allergies as of 1/5/2017     No Known Allergies      Immunizations Administered on Date of Encounter - 1/5/2017     None      Orders Placed During Today's Visit      Normal Orders This Visit    Ambulatory referral to Gynecology     Future Labs/Procedures Expected by Expires    CBC auto differential  1/5/2017 3/6/2018    Comprehensive metabolic panel  1/5/2017 3/6/2018    Ferritin  1/5/2017 3/6/2018    Hemoglobin A1c  1/5/2017 3/6/2018    Lipid panel  1/5/2017 3/6/2018    Microalbumin/creatinine urine ratio  1/5/2017 3/6/2018    TSH  1/5/2017 3/6/2018      "

## 2017-01-06 ENCOUNTER — TELEPHONE (OUTPATIENT)
Dept: INTERNAL MEDICINE | Facility: CLINIC | Age: 27
End: 2017-01-06

## 2017-01-06 LAB
ESTIMATED AVG GLUCOSE: 258 MG/DL
HBA1C MFR BLD HPLC: 10.6 %

## 2017-01-06 RX ORDER — GLIMEPIRIDE 4 MG/1
4 TABLET ORAL
Qty: 90 TABLET | Refills: 3 | Status: SHIPPED | OUTPATIENT
Start: 2017-01-06 | End: 2018-02-15 | Stop reason: SDUPTHER

## 2017-01-06 NOTE — TELEPHONE ENCOUNTER
Extremely elevated blood glucose levels, will add Amaryl 4 mg daily and will refer to diabetes clinic for close monitoring.  Urine showing microalbumin/protein which is a sign of kidney disease, drink adequate fluids and avoid over-the-counter ibuprofen-like medications

## 2017-04-20 ENCOUNTER — PATIENT OUTREACH (OUTPATIENT)
Dept: ADMINISTRATIVE | Facility: HOSPITAL | Age: 27
End: 2017-04-20

## 2017-08-04 DIAGNOSIS — E11.9 TYPE 2 DIABETES MELLITUS WITHOUT COMPLICATION: ICD-10-CM

## 2017-11-29 ENCOUNTER — PATIENT OUTREACH (OUTPATIENT)
Dept: ADMINISTRATIVE | Facility: HOSPITAL | Age: 27
End: 2017-11-29

## 2017-12-21 ENCOUNTER — PATIENT OUTREACH (OUTPATIENT)
Dept: ADMINISTRATIVE | Facility: HOSPITAL | Age: 27
End: 2017-12-21

## 2018-02-15 DIAGNOSIS — I15.2 HYPERTENSION ASSOCIATED WITH DIABETES: ICD-10-CM

## 2018-02-15 DIAGNOSIS — E11.9 TYPE 2 DIABETES MELLITUS WITHOUT COMPLICATION, WITHOUT LONG-TERM CURRENT USE OF INSULIN: ICD-10-CM

## 2018-02-15 DIAGNOSIS — E11.59 HYPERTENSION ASSOCIATED WITH DIABETES: ICD-10-CM

## 2018-02-15 RX ORDER — METFORMIN HYDROCHLORIDE 1000 MG/1
TABLET ORAL
Qty: 60 TABLET | Refills: 0 | Status: SHIPPED | OUTPATIENT
Start: 2018-02-15

## 2018-02-15 RX ORDER — LISINOPRIL AND HYDROCHLOROTHIAZIDE 20; 25 MG/1; MG/1
TABLET ORAL
Qty: 30 TABLET | Refills: 0 | Status: SHIPPED | OUTPATIENT
Start: 2018-02-15

## 2018-02-15 RX ORDER — GLIMEPIRIDE 4 MG/1
TABLET ORAL
Qty: 30 TABLET | Refills: 0 | Status: SHIPPED | OUTPATIENT
Start: 2018-02-15 | End: 2018-04-13 | Stop reason: SDUPTHER

## 2018-04-15 RX ORDER — GLIMEPIRIDE 4 MG/1
TABLET ORAL
Qty: 30 TABLET | Refills: 0 | Status: SHIPPED | OUTPATIENT
Start: 2018-04-15

## 2018-06-27 ENCOUNTER — PATIENT OUTREACH (OUTPATIENT)
Dept: ADMINISTRATIVE | Facility: HOSPITAL | Age: 28
End: 2018-06-27